# Patient Record
Sex: FEMALE | Race: WHITE | NOT HISPANIC OR LATINO | Employment: UNEMPLOYED | URBAN - METROPOLITAN AREA
[De-identification: names, ages, dates, MRNs, and addresses within clinical notes are randomized per-mention and may not be internally consistent; named-entity substitution may affect disease eponyms.]

---

## 2020-08-21 ENCOUNTER — OFFICE VISIT (OUTPATIENT)
Dept: URGENT CARE | Facility: CLINIC | Age: 25
End: 2020-08-21
Payer: COMMERCIAL

## 2020-08-21 VITALS
BODY MASS INDEX: 25.99 KG/M2 | TEMPERATURE: 99.2 F | HEART RATE: 88 BPM | RESPIRATION RATE: 12 BRPM | OXYGEN SATURATION: 99 % | WEIGHT: 156 LBS | SYSTOLIC BLOOD PRESSURE: 122 MMHG | HEIGHT: 65 IN | DIASTOLIC BLOOD PRESSURE: 76 MMHG

## 2020-08-21 DIAGNOSIS — R10.32 LEFT LOWER QUADRANT ABDOMINAL PAIN: ICD-10-CM

## 2020-08-21 LAB
APPEARANCE UR: CLEAR
BILIRUB UR STRIP-MCNC: NORMAL MG/DL
COLOR UR AUTO: YELLOW
GLUCOSE UR STRIP.AUTO-MCNC: NORMAL MG/DL
INT CON NEG: NORMAL
INT CON POS: NORMAL
KETONES UR STRIP.AUTO-MCNC: NORMAL MG/DL
LEUKOCYTE ESTERASE UR QL STRIP.AUTO: NORMAL
NITRITE UR QL STRIP.AUTO: NORMAL
PH UR STRIP.AUTO: 7.5 [PH] (ref 5–8)
POC URINE PREGNANCY TEST: NEGATIVE
PROT UR QL STRIP: NORMAL MG/DL
RBC UR QL AUTO: NORMAL
SP GR UR STRIP.AUTO: 1.02
UROBILINOGEN UR STRIP-MCNC: 0.2 MG/DL

## 2020-08-21 PROCEDURE — 81002 URINALYSIS NONAUTO W/O SCOPE: CPT | Performed by: PHYSICIAN ASSISTANT

## 2020-08-21 PROCEDURE — 99214 OFFICE O/P EST MOD 30 MIN: CPT | Performed by: PHYSICIAN ASSISTANT

## 2020-08-21 PROCEDURE — 81025 URINE PREGNANCY TEST: CPT | Performed by: PHYSICIAN ASSISTANT

## 2020-08-21 RX ORDER — DESOGESTREL AND ETHINYL ESTRADIOL 0.15-0.03
KIT ORAL
COMMUNITY
Start: 2020-07-29

## 2020-08-21 ASSESSMENT — ENCOUNTER SYMPTOMS
ARTHRALGIAS: 0
BLOOD IN STOOL: 0
CHILLS: 0
FEVER: 0
SHORTNESS OF BREATH: 0
ANOREXIA: 0
MUSCULOSKELETAL NEGATIVE: 1
EYE DISCHARGE: 0
NAUSEA: 0
DIZZINESS: 0
VOMITING: 0
EYE REDNESS: 0
ABDOMINAL PAIN: 1
FLATUS: 0
CONSTIPATION: 0
DIARRHEA: 0

## 2020-08-22 NOTE — PROGRESS NOTES
Subjective:      Carissa Eason is a 24 y.o. female who presents with LLQ Pain (started last night )            LLQ Pain  This is a new problem. The current episode started yesterday. The onset quality is sudden. The problem occurs constantly. The problem has been unchanged. The pain is located in the LLQ. The pain is at a severity of 3/10. The pain is mild. The quality of the pain is sharp. The abdominal pain does not radiate. Pertinent negatives include no anorexia, arthralgias, constipation, diarrhea, dysuria, fever, flatus, frequency, hematuria, nausea or vomiting. Nothing aggravates the pain. The pain is relieved by nothing. She has tried nothing for the symptoms. There is no history of abdominal surgery, gallstones or GERD.     Patient presents to urgent care reporting LLQ abdominal pain starting last night during intercourse. The pain persisted today and worsened slightly, described as sharp and waxing and waning in severity. She is otherwise feeling well and denies fevers, chills, body aches, nausea, vomiting, constipation, diarrhea, urinary symptoms, flank pain, vaginal discharge/itching, or vaginal bleeding. LMP was 2 weeks ago. She is currently taking OCPs. She has no known medical problems and doesn't take any other regular medications. No history of abdominal surgeries. She denies history of ovarian cysts or kidney stones.     Review of Systems   Constitutional: Negative for chills and fever.   HENT: Negative for congestion.    Eyes: Negative for discharge and redness.   Respiratory: Negative for shortness of breath.    Cardiovascular: Negative for chest pain.   Gastrointestinal: Positive for abdominal pain. Negative for anorexia, blood in stool, constipation, diarrhea, flatus, nausea and vomiting.   Genitourinary: Negative.  Negative for dysuria, frequency, hematuria and urgency.   Musculoskeletal: Negative.  Negative for arthralgias.   Skin: Negative for rash.   Neurological: Negative for dizziness.  "       Objective:     /76   Pulse 88   Temp 37.3 °C (99.2 °F) (Temporal)   Resp 12   Ht 1.651 m (5' 5\")   Wt 70.8 kg (156 lb)   LMP 08/07/2020 (Exact Date)   SpO2 99%   Breastfeeding No   BMI 25.96 kg/m²        Physical Exam  Vitals signs and nursing note reviewed.   Constitutional:       General: She is not in acute distress.     Appearance: Normal appearance. She is well-developed. She is not diaphoretic.   HENT:      Head: Normocephalic and atraumatic.      Right Ear: External ear normal.      Left Ear: External ear normal.      Nose: Nose normal.   Eyes:      Pupils: Pupils are equal, round, and reactive to light.   Neck:      Musculoskeletal: Normal range of motion.   Cardiovascular:      Rate and Rhythm: Normal rate.   Pulmonary:      Effort: Pulmonary effort is normal.   Abdominal:      General: Abdomen is flat. Bowel sounds are normal. There is no distension.      Palpations: Abdomen is soft.      Tenderness: There is no abdominal tenderness. There is no right CVA tenderness, left CVA tenderness, guarding or rebound. Negative signs include Hua's sign, McBurney's sign and obturator sign.   Musculoskeletal: Normal range of motion.   Skin:     General: Skin is warm and dry.   Neurological:      Mental Status: She is alert and oriented to person, place, and time.   Psychiatric:         Behavior: Behavior normal.          PMH:  has no past medical history on file.  MEDS:   Current Outpatient Medications:   •  ENSKYCE 0.15-30 MG-MCG per tablet, , Disp: , Rfl:   ALLERGIES: No Known Allergies  SURGHX: History reviewed. No pertinent surgical history.  SOCHX:  reports that she has never smoked. She has never used smokeless tobacco. She reports current alcohol use. She reports current drug use. Drug: Marijuana.  FH: family history is not on file.     POCT Urinalysis:   Ref Range & Units 6:08 PM   POC Color Negative YELLOW    POC Appearance Negative CLEAR    POC Leukocyte Esterase Negative NEG    POC " Nitrites Negative NEG    POC Urobiligen Negative (0.2) mg/dL 0.2    POC Protein Negative mg/dL NEG    POC Urine PH 5.0 - 8.0 7.5    POC Blood Negative NEG    POC Specific Gravity <1.005 - >1.030 1.020    POC Ketones Negative mg/dL NEG    POC Bilirubin Negative mg/dL NEG    POC Glucose Negative mg/dL NEG        Assessment/Plan:        1. Left lower quadrant abdominal pain    - POCT Urinalysis --> normal  - POCT Pregnancy --> negative  - US-PELVIC COMPLETE (TRANSABDOMINAL/TRANSVAGINAL) (COMBO); Future      No abdominal tenderness to palpation on exam at today's visit. Low concern for acute abdomen at this time. She will have pelvic US performed as soon as possible, pending results. ED precautions discussed at length. The patient demonstrated a good understanding and agreed with the treatment plan.

## 2020-08-31 ENCOUNTER — HOSPITAL ENCOUNTER (OUTPATIENT)
Dept: RADIOLOGY | Facility: MEDICAL CENTER | Age: 25
End: 2020-08-31
Attending: PHYSICIAN ASSISTANT
Payer: COMMERCIAL

## 2020-08-31 DIAGNOSIS — R10.32 LEFT LOWER QUADRANT ABDOMINAL PAIN: ICD-10-CM

## 2020-08-31 PROCEDURE — 76830 TRANSVAGINAL US NON-OB: CPT

## 2020-09-02 ENCOUNTER — TELEPHONE (OUTPATIENT)
Dept: URGENT CARE | Facility: PHYSICIAN GROUP | Age: 25
End: 2020-09-02

## 2020-09-02 NOTE — TELEPHONE ENCOUNTER
Spoke to patient and informed her of normal US results. She states she is feeling better and appreciates the phone call.